# Patient Record
Sex: MALE | Race: WHITE | URBAN - METROPOLITAN AREA
[De-identification: names, ages, dates, MRNs, and addresses within clinical notes are randomized per-mention and may not be internally consistent; named-entity substitution may affect disease eponyms.]

---

## 2024-02-04 ENCOUNTER — APPOINTMENT (OUTPATIENT)
Dept: GENERAL RADIOLOGY | Age: 54
End: 2024-02-04
Attending: PHYSICIAN ASSISTANT
Payer: COMMERCIAL

## 2024-02-04 ENCOUNTER — HOSPITAL ENCOUNTER (OUTPATIENT)
Age: 54
Discharge: HOME | End: 2024-02-04
Payer: COMMERCIAL

## 2024-02-04 VITALS
HEART RATE: 95 BPM | SYSTOLIC BLOOD PRESSURE: 151 MMHG | DIASTOLIC BLOOD PRESSURE: 83 MMHG | OXYGEN SATURATION: 100 % | TEMPERATURE: 98 F | RESPIRATION RATE: 24 BRPM

## 2024-02-04 DIAGNOSIS — H60.501 ACUTE OTITIS EXTERNA OF RIGHT EAR, UNSPECIFIED TYPE: ICD-10-CM

## 2024-02-04 DIAGNOSIS — J02.9 SORE THROAT: Primary | ICD-10-CM

## 2024-02-04 DIAGNOSIS — R05.1 ACUTE COUGH: ICD-10-CM

## 2024-02-04 LAB
POCT INFLUENZA A: NEGATIVE
POCT INFLUENZA B: NEGATIVE
S PYO AG THROAT QL: NEGATIVE
SARS-COV-2 RNA RESP QL NAA+PROBE: NOT DETECTED

## 2024-02-04 PROCEDURE — 71046 X-RAY EXAM CHEST 2 VIEWS: CPT | Performed by: PHYSICIAN ASSISTANT

## 2024-02-04 PROCEDURE — 87502 INFLUENZA DNA AMP PROBE: CPT | Performed by: PHYSICIAN ASSISTANT

## 2024-02-04 PROCEDURE — 99204 OFFICE O/P NEW MOD 45 MIN: CPT | Performed by: PHYSICIAN ASSISTANT

## 2024-02-04 PROCEDURE — 87880 STREP A ASSAY W/OPTIC: CPT | Performed by: PHYSICIAN ASSISTANT

## 2024-02-04 PROCEDURE — U0002 COVID-19 LAB TEST NON-CDC: HCPCS | Performed by: PHYSICIAN ASSISTANT

## 2024-02-04 PROCEDURE — A9150 MISC/EXPER NON-PRESCRIPT DRU: HCPCS | Performed by: PHYSICIAN ASSISTANT

## 2024-02-04 RX ORDER — ASPIRIN 81 MG/1
TABLET, CHEWABLE ORAL
COMMUNITY
Start: 2023-07-06

## 2024-02-04 RX ORDER — CIPROFLOXACIN AND DEXAMETHASONE 3; 1 MG/ML; MG/ML
4 SUSPENSION/ DROPS AURICULAR (OTIC) 2 TIMES DAILY
Qty: 1 EACH | Refills: 0 | Status: SHIPPED | OUTPATIENT
Start: 2024-02-04 | End: 2024-02-11

## 2024-02-04 RX ORDER — BENZONATATE 100 MG/1
100 CAPSULE ORAL 3 TIMES DAILY PRN
Qty: 15 CAPSULE | Refills: 0 | Status: SHIPPED | OUTPATIENT
Start: 2024-02-04

## 2024-02-04 RX ORDER — OMEPRAZOLE 20 MG/1
20 CAPSULE, DELAYED RELEASE ORAL
COMMUNITY

## 2024-02-04 RX ORDER — LOSARTAN POTASSIUM 25 MG/1
TABLET ORAL DAILY
COMMUNITY

## 2024-02-04 RX ORDER — ACETAMINOPHEN 500 MG
1000 TABLET ORAL ONCE
Status: COMPLETED | OUTPATIENT
Start: 2024-02-04 | End: 2024-02-04

## 2024-02-04 NOTE — ED PROVIDER NOTES
Patient Seen in: [facility]mediate [facility]      History     Chief Complaint   Patient presents with    Sore Throat     Stated Complaint: Sore throat    Subjective:   HPI    52 YO male presents to immediate care with his wife for evaluation of sore throat, cough, subjective fever, ear pain and nasal congestion starting 3 days ago.  Patient believes cough is worsening and is concerned for pneumonia.      Objective:   Past Medical History:   Diagnosis Date    Aortic aneurysm (HCC)               Past Surgical History:   Procedure Laterality Date    EXCIS LUMBAR DISK,ONE LEVEL      VALVECTOMY TRICUSPID W CP BYPASS                  Social History     Socioeconomic History    Marital status:    Tobacco Use    Smoking status: Never     Passive exposure: Never    Smokeless tobacco: Never   Vaping Use    Vaping Use: Never used   Substance and Sexual Activity    Alcohol use: Yes     Comment: social    Drug use: Not Currently              Review of Systems    Positive for stated complaint: Sore throat  Other systems are as noted in HPI.  Constitutional and vital signs reviewed.      All other systems reviewed and negative except as noted above.    Physical Exam     ED Triage Vitals   BP 02/04/24 1017 135/85   Pulse 02/04/24 1015 100   Resp 02/04/24 1015 26   Temp 02/04/24 1015 98.8 °F (37.1 °C)   Temp src 02/04/24 1015 Temporal   SpO2 02/04/24 1015 97 %   O2 Device 02/04/24 1015 None (Room air)       Current:/83   Pulse 95   Temp 98 °F (36.7 °C) (Temporal)   Resp 24   SpO2 100%         Physical Exam  Vitals and nursing note reviewed.   Constitutional:       General: He is not in acute distress.     Appearance: He is not ill-appearing, toxic-appearing or diaphoretic.   HENT:      Right Ear: Tympanic membrane normal.      Left Ear: Tympanic membrane and ear canal normal.      Ears:      Comments: Right EAC has erythema and mild swelling.  Tenderness with right tragal pressure and right auricle  manipulation.  TM appears intact and normal.      Nose: No congestion or rhinorrhea.      Mouth/Throat:      Mouth: Mucous membranes are moist.      Pharynx: Posterior oropharyngeal erythema present. No oropharyngeal exudate.   Cardiovascular:      Rate and Rhythm: Normal rate and regular rhythm.   Pulmonary:      Effort: Pulmonary effort is normal. No respiratory distress.      Breath sounds: Normal breath sounds. No wheezing.   Neurological:      Mental Status: He is alert and oriented to person, place, and time.   Psychiatric:         Mood and Affect: Mood normal.         Behavior: Behavior normal.         ED Course     Labs Reviewed   POCT RAPID STREP - Normal   POCT FLU TEST - Normal    Narrative:     This assay is a rapid molecular in vitro test utilizing nucleic acid amplification of influenza A and B viral RNA.   RAPID SARS-COV-2 BY PCR - Normal     XR CHEST PA + LAT CHEST (CPT=71046)    Result Date: 2/4/2024  PROCEDURE:  XR CHEST PA + LAT CHEST (CPT=71046)  INDICATIONS:  Sore throat  COMPARISON:  None.  TECHNIQUE:  PA and lateral chest radiographs were obtained.  PATIENT STATED HISTORY: (As transcribed by Technologist)  Fever, cough, sore throat, and congestion for 3 days.    FINDINGS:  Heart size is within normal limits.  Pleural spaces appear clear.  Mediastinal and hilar contours are normal.  No focal consolidation.  Postsurgical changes of the chest are noted.  There are presumed postsurgical changes at the gastroesophageal junction  with metallic device present.  Correlation with the history is suggested.  If clinical symptoms persist then recommend follow-up imaging.            CONCLUSION:  See above.   LOCATION:  [facility]   Dictated by (CST): Papo Mendez MD on 2/04/2024 at 11:58 AM     Finalized by (CST): Papo Mendez MD on 2/04/2024 at 11:59 AM        I independently reviewed chest x-ray images.  Postsurgical changes.  No focal consolidation.    MDM      This is a 54 YO male that presents to immediate  care with is wife for evaluation of sore throat, cough, subjective fever, ear pain and nasal congestion starting 3 days ago.  Patient believes cough is worsening and is concerned for pneumonia.    Patient is well-appearing, afebrile. COVID, Influenza and Strep negative.  Chest x-ray negative for acute cardiopulmonary process.  I thoroughly discussed with patient that symptoms are most consistent with viral URI at this time. Patient was not receptive and verbalized multiple times his desire for antibiotics in case symptoms worsen. He was informed that re-evaluation is appropriate for any new, concerning or worsening symptoms. I prescribed tessalon perles for cough. Ciprodex otic suspension for right otitis externa. Thoroughly discussed supportive care, which again patient was not receptive to.         Medical Decision Making  Amount and/or Complexity of Data Reviewed  Labs: ordered. Decision-making details documented in ED Course.  Radiology: ordered. Decision-making details documented in ED Course.    Risk  Prescription drug management.        Disposition and Plan     Clinical Impression:  1. Sore throat    2. Acute otitis externa of right ear, unspecified type    3. Acute cough         Disposition:  Discharge  2/4/2024 12:04 pm    Follow-up:  No follow-up provider specified.        Medications Prescribed:  Discharge Medication List as of 2/4/2024 12:20 PM        START taking these medications    Details   ciprofloxacin-dexamethasone (CIPRODEX) 0.3-0.1 % Otic Suspension Place 4 drops into the right ear 2 (two) times daily for 7 days., Print, Disp-1 each, R-0      benzonatate (TESSALON PERLES) 100 MG Oral Cap Take 1 capsule (100 mg total) by mouth 3 (three) times daily as needed for cough., Print, Disp-15 capsule, R-0                                             EMS

## 2024-02-04 NOTE — ED QUICK NOTES
Rn attempted to suggest OTC afrin or Mucinex D  for upcoming flight and pt was not receptive as he wanted an antibiotic, he stated he was very unhappy that he was not getting one, despite PA's explanation of unnecessary antibiotic use, and need to follow up if he got worse.